# Patient Record
Sex: MALE | Race: WHITE
[De-identification: names, ages, dates, MRNs, and addresses within clinical notes are randomized per-mention and may not be internally consistent; named-entity substitution may affect disease eponyms.]

---

## 2017-09-23 ENCOUNTER — HOSPITAL ENCOUNTER (EMERGENCY)
Dept: HOSPITAL 10 - FTE | Age: 67
Discharge: HOME | End: 2017-09-23
Payer: MEDICAID

## 2017-09-23 VITALS — WEIGHT: 176.37 LBS | HEIGHT: 60 IN | BODY MASS INDEX: 34.63 KG/M2

## 2017-09-23 DIAGNOSIS — Z79.84: ICD-10-CM

## 2017-09-23 DIAGNOSIS — K59.00: Primary | ICD-10-CM

## 2017-09-23 DIAGNOSIS — E11.9: ICD-10-CM

## 2017-09-23 DIAGNOSIS — I10: ICD-10-CM

## 2017-09-23 PROCEDURE — 74010: CPT

## 2017-09-23 NOTE — RADRPT
PROCEDURE:   XR Abdomen. 

 

CLINICAL INDICATION:   Pain. Constipation. 

 

TECHNIQUE:   Four upright and supine frontal radiographs of the abdomen are available for review. 

 

COMPARISON:   None

 

FINDINGS:

 

There is abundant stool throughout the colon. There is scattered air-fluid levels in nondilated smal
l and large bowel. There is no evidence for free gas. Bones unremarkable.

 

IMPRESSION:

 

There is stool compatible with constipation. Air-fluid levels in nondilated bowel, likely related to
 constipation.

 

RPTAT:  HMVK

_____________________________________________ 

.Chauncey Pena MD, MD           Date    Time 

Electronically viewed and signed by .Chauncey Pena MD, MD on 09/23/2017 20:40 

 

D:  09/23/2017 20:40  T:  09/23/2017 20:40

.K/

## 2017-09-23 NOTE — ERD
ER Documentation


Chief Complaint


Date/Time


DATE: 17 


TIME: 21:09


Chief Complaint


constipation x 1 week





HPI


This is a 67-year-old male who presents to the ER with constipation for the 

last week.  Patient states he has not been able have a bowel movement over the 

last week.  He admits to some mild abdominal pain.  He denies any nausea or 

vomiting.  Patient tried milk of magnesia however it did not work.  Patient had 

an episode like this a few years back which resolved spontaneously. Patient has 

not had any abdominal surgeries in the past.





ROS


12 point review of systems was done, all negative except per HPI.





Medications


Home Meds


Active Scripts


Docusate Sodium* (Colace*) 100 Mg Capsule, 100 MG PO TID, #30 CAP


   Prov:WHITLEYINO GOYAL         17


Psyllium Seed* (Metamucil* Powder) 1,040 Gm Powder, 10 GM PO TID for 7 Days, EA


   Prov:INO ARREAGA         17


Reported Medications


Metformin* (Glucophage*) 500 Mg Tab, 500 MG PO BID WITH MEALS, TAB


   4/6/15


Benazepril Hcl* (Benazepril Hcl*) 40 Mg Tablet, 40 MG PO DAILY, TAB


   4/6/15





Allergies


Allergies:  


Coded Allergies:  


     No Known Allergy (Unverified , 14)





PMhx/Soc


Medical and Surgical Hx:  pt denies Surgical Hx


History of Surgery:  No


Anesthesia Reaction:  No


Hx Neurological Disorder:  No


Hx Respiratory Disorders:  No


Hx Cardiac Disorders:  Yes (HTN)


Hx Psychiatric Problems:  No


Hx Miscellaneous Medical Probl:  Yes (DM)


Hx Alcohol Use:  No


Hx Substance Use:  No


Hx Tobacco Use:  No


Smoking Status:  Never smoker





Physical Exam


Vitals





Vital Signs








  Date Time  Temp Pulse Resp B/P Pulse Ox O2 Delivery O2 Flow Rate FiO2


 


17 18:59 97.0 61 20 189/84 100   








Physical Exam


GENERAL: The patient is well developed and appropriate for usual state of health

, in no apparent distress.


HEENT: Atraumatic. 


CHEST: Clear to auscultation bilaterally. There are no rales, wheezes or 

rhonchi. 


HEART: Regular rate and rhythm. No murmurs, clicks, rubs or gallops.


ABDOMEN: Soft, nontender and nondistended. Good bowel sounds. No rebound or 

guarding. No gross peritonitis. No gross organomegaly or masses. No Kee sign 

or McBurney point tenderness.


NEURO: Alert and oriented. 


SKIN: The skin is warm and dry.


Results 24 hrs





 Current Medications








 Medications


  (Trade)  Dose


 Ordered  Sig/Meme


 Route


 PRN Reason  Start Time


 Stop Time Status Last Admin


Dose Admin


 


 Lactulose


  (Enulose)  20 gm  ONCE  ONCE


 PO


   17 19:30


 17 19:31 DC 17 19:16


 





[place] [id number]


 Radiology Main Line: [phone]





 DIAGNOSTIC IMAGING REPORT





 Patient: EKATERINA ABRAHAM   : 1950   Age: 67  Sex: M                   

     


 MR #:    [id number]   Acct #:   [id number]    DOS: 17 0000


 Ordering MD: INO ARREAGA. PA-C   Location:  [facility]   Room/Bed:              

                              


 








PROCEDURE:   XR Abdomen. 


 


CLINICAL INDICATION:   Pain. Constipation. 


 


TECHNIQUE:   Four upright and supine frontal radiographs of the abdomen are 

available for review. 


 


COMPARISON:   None


 


FINDINGS:


 


There is abundant stool throughout the colon. There is scattered air-fluid 

levels in nondilated small and large bowel. There is no evidence for free gas. 

Bones unremarkable.


 


IMPRESSION:


 


There is stool compatible with constipation. Air-fluid levels in nondilated 

bowel, likely related to constipation.


 


RPTAT:  HMVK


_____________________________________________ 


.Chauncey Pena MD, MD           Date    Time 


Electronically viewed and signed by .Chauncey Pena MD, MD on 2017 20:40 


 


D:  2017 20:40  T:  2017 20:40


.K/





CC: INO ARERAGA





Procedures/MDM


This is a 67-year-old male presents to the ER with constipation.  At this time 

there is no evidence of obstruction.  Will be sent home with docusate and 

Metamucil.  Patient is to follow-up with PCP within 1-2 days or return to ER 

sooner if symptoms worsen. My medical decision making was shared with the 

patient, he understands and agrees with plan.





Departure


Diagnosis:  


 Primary Impression:  


 Constipation


Condition:  Stable


Patient Instructions:  Constipation (Adult)





Additional Instructions:  


Llame al doctor MAANA y brian litzy SCOTTY PARA DENTRO DE 1-2 WALLACE.Dgale a la 

secretaria que nosotros le instruimos hacer esta scotty.Avise o llame si talavera 

condicin se empeora antes de la scotty. Regresa aqui si peor o no mejor.











INO ARREAGA Sep 23, 2017 21:20

## 2017-11-16 ENCOUNTER — HOSPITAL ENCOUNTER (EMERGENCY)
Dept: HOSPITAL 10 - FTE | Age: 67
Discharge: HOME | End: 2017-11-16
Payer: MEDICAID

## 2017-11-16 VITALS
BODY MASS INDEX: 27.72 KG/M2 | WEIGHT: 176.59 LBS | BODY MASS INDEX: 27.72 KG/M2 | WEIGHT: 176.59 LBS | HEIGHT: 67 IN | HEIGHT: 67 IN

## 2017-11-16 VITALS
TEMPERATURE: 98.7 F | RESPIRATION RATE: 17 BRPM | HEART RATE: 70 BPM | SYSTOLIC BLOOD PRESSURE: 118 MMHG | DIASTOLIC BLOOD PRESSURE: 60 MMHG

## 2017-11-16 DIAGNOSIS — Z79.84: ICD-10-CM

## 2017-11-16 DIAGNOSIS — I10: ICD-10-CM

## 2017-11-16 DIAGNOSIS — E11.9: ICD-10-CM

## 2017-11-16 DIAGNOSIS — J06.9: Primary | ICD-10-CM

## 2017-11-16 PROCEDURE — 71010: CPT

## 2017-11-16 PROCEDURE — 87400 INFLUENZA A/B EACH AG IA: CPT

## 2017-11-16 NOTE — RADRPT
PROCEDURE:   XR Chest. 

 

CLINICAL INDICATION:   Cough 

 

TECHNIQUE:   Single frontal chest x-ray. 

 

COMPARISON:   None. 

 

FINDINGS:

 

The lungs are clear.  No focal opacification is seen. There is mild to moderate elevation of the lef
t hemidiaphragm. The cardiomediastinal silhouette is unremarkable.  The osseous structures are remar
kable for multilevel degenerative enthesopathy of the spine.   

 

IMPRESSION:

 

1.  There is no acute cardiopulmonary process.

2.  Mild elevation of the left hemidiaphragm.

3.  Multilevel degenerative enthesopathy of the spine.

 

 

RPTAT: PP

_____________________________________________ 

.Eugene Zimmerman MD, MD           Date    Time 

Electronically viewed and signed by .Eugene Zimmerman MD, MD on 11/16/2017 22:10 

 

D:  11/16/2017 22:10  T:  11/16/2017 22:10

.B/

## 2017-11-16 NOTE — ERD
ER Documentation


Chief Complaint


Chief Complaint


BIB SELF C/O COUGH AND CONGESTION X 2 DAYS





HPI


This is a 67-year-old male who presents the emergency department today 

complaining of cough and congestion for the past couple of days.  Patient 

states he is also had some body aches and sore throat.  States that the cough 

is worse at night.  States he has not taken any medication for the cough.





ROS


All systems reviewed and are negative except as per history of present illness.





Medications


Home Meds


Active Scripts


Cetirizine Hcl* (Zyrtec*) 10 Mg Capsule, 10 MG PO DAILY, #15 TAB.CHEW


   Prov:YANICK HAAS PA-C         17


Acetaminophen* (Tylophen*) 500 Mg Capsule, 1 CAP PO Q6H Y for PAIN AND OR 

ELEVATED TEMP, #30 CAP


   Prov:YANICK HAAS PA-C         17


Ibuprofen* (Motrin*) 600 Mg Tab, 600 MG PO Q6, #30 TAB


   Prov:YANICK HAAS PA-C         17


Guaifenesin-Dextromethorphan* (Robitussin* DM) 100MG/10MG/5ML Syrup, 10 ML PO 

Q6H Y for COUGH for 5 Days, ML


   Prov:YANICK HAAS PA-C         17


Docusate Sodium* (Colace*) 100 Mg Capsule, 100 MG PO TID, #30 CAP


   Prov:INO ARREAAG         17


Psyllium Seed* (Metamucil* Powder) 1,040 Gm Powder, 10 GM PO TID for 7 Days, EA


   Prov:INO ARREAGA         17


Reported Medications


Metformin* (Glucophage*) 500 Mg Tab, 500 MG PO BID WITH MEALS, TAB


   4/6/15


Benazepril Hcl* (Benazepril Hcl*) 40 Mg Tablet, 40 MG PO DAILY, TAB


   4/6/15





Allergies


Allergies:  


Coded Allergies:  


     No Known Allergy (Unverified , 14)





PMhx/Soc


History of Surgery:  No


Anesthesia Reaction:  No


Hx Neurological Disorder:  No


Hx Respiratory Disorders:  No


Hx Cardiac Disorders:  Yes (HTN)


Hx Psychiatric Problems:  No


Hx Miscellaneous Medical Probl:  Yes (DM)


Hx Alcohol Use:  No


Hx Substance Use:  No


Hx Tobacco Use:  No


Smoking Status:  Never smoker





Physical Exam


Vitals





Vital Signs








  Date Time  Temp Pulse Resp B/P Pulse Ox O2 Delivery O2 Flow Rate FiO2


 


17 23:30 98.7 70 17 118/60 95 Room Air  


 


17 19:07 98.1 73 20 137/63 98   








Physical Exam


Const:     NAD


Head:   Atraumatic 


Eyes:    Normal Conjunctiva


ENT:    TMs normal.  Nose no drainage.  Throat no erythema no exudate.


Neck:               Full range of motion..~ No meningismus.


Resp:    Clear to auscultation bilaterally. No absent breath sounds


Cardio:    Regular rate and rhythm, no murmurs


Abd:    Soft, non tender, non distended. Normal bowel sounds


Skin:    No petechiae or rashes


Back:    No midline or flank tenderness


Ext:    No cyanosis, or edema


Neur:    Awake and alert


Psych:    Normal Mood and Affect


Results 24 hrs





 Current Medications








 Medications


  (Trade)  Dose


 Ordered  Sig/Meme


 Route


 PRN Reason  Start Time


 Stop Time Status Last Admin


Dose Admin


 


 Ibuprofen


  (Motrin)  800 mg  ONCE  ONCE


 PO


   17 22:30


 17 22:31 DC 17 22:40


 





DIAGNOSTIC IMAGING REPORT





 Patient: EKATERINA ABRAHAM   : 1950   Age: 67  Sex: M                   

     


 MR #:    [id number]   Acct #:   [id number]    DOS: 17 0000


 Ordering MD: YANICK HAAS PA-C   Location:  [facility]   Room/Bed:             

                               


 








PROCEDURE:   XR Chest. 


 


CLINICAL INDICATION:   Cough 


 


TECHNIQUE:   Single frontal chest x-ray. 


 


COMPARISON:   None. 


 


FINDINGS:


 


The lungs are clear.  No focal opacification is seen. There is mild to moderate 

elevation of the left hemidiaphragm. The cardiomediastinal silhouette is 

unremarkable.  The osseous structures are remarkable for multilevel 

degenerative enthesopathy of the spine.   


 


IMPRESSION:


 


1.  There is no acute cardiopulmonary process.


2.  Mild elevation of the left hemidiaphragm.


3.  Multilevel degenerative enthesopathy of the spine.


 


 


RPTAT: PP


_____________________________________________ 


.Eugene Zimmerman MD, MD           Date    Time 


Electronically viewed and signed by .Eugene Zimmerman MD, MD on 2017 22:10 


 


D:  2017 22:10  T:  2017 22:10


.B/





CC: YANICK HAAS PA-C


RUN DATE: 17      [facility]                

  PAGE 1   


RUN TIME: [id number] [place] [address]


                         Marcelino Parish M.D. Medical Director 


                                  TIANNA#: [id number]  


                           Ph: [phone]  Fax: [phone]














--------------------------------------------------------------------------------

------------





Name:  EKATERINA ABRAHAM                  Age/Sex: 67/M      Attend Dr: RAMANDEEP HEALY MD      


Acct:  [id number]  MR# : [id number]  : 1950    Location:  [facility]       

             


Admit: 17


--------------------------------------------------------------------------------

------------








Specimen: [id number]    Status: Complete    Rafael:     Rcvd: 


                                Source:    [facility] Descrip:        

   


--------------------------------------------------------------------------------

------------





  Procedure                         Result                                     

           


--------------------------------------------------------------------------------

------------





                                                                               

            


                                    *** Microbiology ***                       

             


                                                                               

            


  INFLUENZA A & B BY EIA  Final  


        INFLU A&B BY EIA            INFLUENZA A NEGATIVE (Ref Range Neg)


                                    INFLUENZA B NEGATIVE (Ref Range Neg)





--------------------------------------------------------------------------------

------------
















































































 

................................................................................

............




 Flags: Critical Hi = *H      Critical Lo = *L      Microbiology Abnormal = *


        Abnormal Hi =  H      Abnormal Lo =  L      Blood Bank Abnormal   = *


 Susceptability Flags:   S = Sensitive     R = Resistant     I = Intermediate





                                   ** END OF REPORT **     





Procedures/MDM


This is a 67-year-old male presents to the emergency department today 

complaining of cough and congestion and flulike symptoms for the past 2 days.  

Given patient's age and complaints I did obtain a chest x-ray and influenza swab





Influenza A and B is negative





Chest x ray shows no acute cardiopulmonary process.  There is mild elevation of 

the left hemidiaphragm and multilevel degenerative changes of the spine.  Low 

suspicion for pneumonia, PE, abscess, pleural effusion, pneumothorax








Symptoms at this time is consistent with URI likely viral vs influenza-like 

symptoms.  Patient is afebrile.  He is not tachycardic.  His oxygen saturation 

is 98%.  I have low suspicion for strep pharyngitis, peritonsillar abscess, 

retropharyngeal abscess, otitis media, PNA, sinusitis, abscess, meningitis, 

sepsis, or other acute infectious bacterial process.  





Patient was given Motrin here in the emergency department. Patient was given a 

prescription for Tylenol, Motrin, Robitussin, Zyrtec





At this time the patient is stable for discharge and outpatient management. 

Patient should follow up with their PCP in the next 1-2 days.  They may return 

to the emergency department sooner for any persistent or worsening of symptoms.

  Patient understood and agreed with the plan.





Departure


Diagnosis:  


 Primary Impression:  


 URI (upper respiratory infection)


 URI type:  unspecified URI  Qualified Code:  J06.9 - Upper respiratory tract 

infection, unspecified type


Condition:  YANICK Garland PA-C 2017 22:23

## 2018-03-23 ENCOUNTER — HOSPITAL ENCOUNTER (EMERGENCY)
Dept: HOSPITAL 91 - FTE | Age: 68
Discharge: HOME | End: 2018-03-23
Payer: COMMERCIAL

## 2018-03-23 ENCOUNTER — HOSPITAL ENCOUNTER (EMERGENCY)
Age: 68
Discharge: HOME | End: 2018-03-23

## 2018-03-23 DIAGNOSIS — Z79.84: ICD-10-CM

## 2018-03-23 DIAGNOSIS — I10: ICD-10-CM

## 2018-03-23 DIAGNOSIS — K59.00: Primary | ICD-10-CM

## 2018-03-23 DIAGNOSIS — E11.9: ICD-10-CM

## 2018-03-23 PROCEDURE — 74018 RADEX ABDOMEN 1 VIEW: CPT

## 2018-03-23 PROCEDURE — 99283 EMERGENCY DEPT VISIT LOW MDM: CPT

## 2018-09-16 ENCOUNTER — HOSPITAL ENCOUNTER (EMERGENCY)
Age: 68
Discharge: HOME | End: 2018-09-16

## 2018-09-16 ENCOUNTER — HOSPITAL ENCOUNTER (EMERGENCY)
Dept: HOSPITAL 91 - E/R | Age: 68
Discharge: HOME | End: 2018-09-16
Payer: COMMERCIAL

## 2018-09-16 DIAGNOSIS — I10: ICD-10-CM

## 2018-09-16 DIAGNOSIS — M79.671: Primary | ICD-10-CM

## 2018-09-16 DIAGNOSIS — E13.49: ICD-10-CM

## 2018-09-16 DIAGNOSIS — M79.672: ICD-10-CM

## 2018-09-16 DIAGNOSIS — Z79.84: ICD-10-CM

## 2018-09-16 LAB
ADD MAN DIFF?: NO
ALANINE AMINOTRANSFERASE: 30 IU/L (ref 13–69)
ALBUMIN/GLOBULIN RATIO: 1.05
ALBUMIN: 4 G/DL (ref 3.3–4.9)
ALKALINE PHOSPHATASE: 105 IU/L (ref 42–121)
ANION GAP: 14 (ref 8–16)
ANISOCYTOSIS: (no result) (ref 0–0)
ASPARTATE AMINO TRANSFERASE: 26 IU/L (ref 15–46)
BAND NEUTROPHILS #M: 0.1 10^3/UL (ref 0–0.6)
BAND NEUTROPHILS % (M): 2 % (ref 0–4)
BASOPHIL #: 0 10^3/UL (ref 0–0.1)
BASOPHIL #M: 0.1 10^3/UL (ref 0–0)
BASOPHILS % (M): 2 % (ref 0–2)
BASOPHILS %: 0.4 % (ref 0–2)
BILIRUBIN,DIRECT: 0 MG/DL (ref 0–0.2)
BILIRUBIN,TOTAL: 0.4 MG/DL (ref 0.2–1.3)
BLOOD UREA NITROGEN: 16 MG/DL (ref 7–20)
CALCIUM: 9 MG/DL (ref 8.4–10.2)
CARBON DIOXIDE: 26 MMOL/L (ref 21–31)
CHLORIDE: 103 MMOL/L (ref 97–110)
CREATININE: 0.85 MG/DL (ref 0.61–1.24)
EOSINOPHILS #: 0.1 10^3/UL (ref 0–0.5)
EOSINOPHILS % (M): 2 % (ref 0–7)
EOSINOPHILS %: 1.1 % (ref 0–7)
GLOBULIN: 3.8 G/DL (ref 1.3–3.2)
GLUCOSE: 195 MG/DL (ref 70–220)
HEMATOCRIT: 39 % (ref 42–52)
HEMOGLOBIN: 13.7 G/DL (ref 14–18)
IMMATURE GRANS #M: 0.04 10^3/UL (ref 0–0.03)
IMMATURE GRANS % (M): 0.4 % (ref 0–0.43)
LYMPHOCYTES #: 2 10^3/UL (ref 0.8–2.9)
LYMPHOCYTES #M: 2 10^3/UL (ref 0.8–2.9)
LYMPHOCYTES % (M): 23 % (ref 15–51)
LYMPHOCYTES %: 22.6 % (ref 15–51)
MEAN CORPUSCULAR HEMOGLOBIN: 29.5 PG (ref 29–33)
MEAN CORPUSCULAR HGB CONC: 35.1 G/DL (ref 32–37)
MEAN CORPUSCULAR VOLUME: 84.1 FL (ref 82–101)
MEAN PLATELET VOLUME: 9.2 FL (ref 7.4–10.4)
MICROCYTOSIS: (no result) (ref 0–0)
MONOCYTE #: 0.6 10^3/UL (ref 0.3–0.9)
MONOCYTE #M: 0.4 10^3/UL (ref 0.3–0.9)
MONOCYTES % (M): 5 % (ref 0–11)
MONOCYTES %: 6.6 % (ref 0–11)
NEUTROPHIL #: 6.2 10^3/UL (ref 1.6–7.5)
NEUTROPHILS %: 68.9 % (ref 39–77)
NUCLEATED RED BLOOD CELLS #: 0 10^3/UL (ref 0–0)
NUCLEATED RED BLOOD CELLS%: 0 /100WBC (ref 0–0)
PLATELET COUNT: 274 10^3/UL (ref 140–415)
PLATELET ESTIMATE: NORMAL
POTASSIUM: 4.3 MMOL/L (ref 3.5–5.1)
REACTIVE LYMPHOCYTES #M: 0.5 10^3/UL (ref 0–0)
REACTIVE LYMPHOCYTES% (M): 6 % (ref 0–0)
RED BLOOD COUNT: 4.64 10^6/UL (ref 4.7–6.1)
RED CELL DISTRIBUTION WIDTH: 13 % (ref 11.5–14.5)
SEG NEUT #M: 5.4 10^3/UL (ref 1.6–7.5)
SEGMENTED NEUTROPHILS (M) %: 60 % (ref 39–77)
SMUDGE%M: 1 % (ref 0–0)
SODIUM: 139 MMOL/L (ref 135–144)
TOTAL PROTEIN: 7.8 G/DL (ref 6.1–8.1)
TROPONIN-I: 0.01 NG/ML (ref 0–0.12)
WHITE BLOOD COUNT: 9 10^3/UL (ref 4.8–10.8)

## 2018-09-16 PROCEDURE — 96374 THER/PROPH/DIAG INJ IV PUSH: CPT

## 2018-09-16 PROCEDURE — 82962 GLUCOSE BLOOD TEST: CPT

## 2018-09-16 PROCEDURE — 99285 EMERGENCY DEPT VISIT HI MDM: CPT

## 2018-09-16 PROCEDURE — 84484 ASSAY OF TROPONIN QUANT: CPT

## 2018-09-16 PROCEDURE — 80053 COMPREHEN METABOLIC PANEL: CPT

## 2018-09-16 PROCEDURE — 93005 ELECTROCARDIOGRAM TRACING: CPT

## 2018-09-16 PROCEDURE — 71045 X-RAY EXAM CHEST 1 VIEW: CPT

## 2018-09-16 PROCEDURE — 36415 COLL VENOUS BLD VENIPUNCTURE: CPT

## 2018-09-16 PROCEDURE — 85025 COMPLETE CBC W/AUTO DIFF WBC: CPT

## 2018-09-16 RX ADMIN — KETOROLAC TROMETHAMINE 1 MG: 15 INJECTION, SOLUTION INTRAMUSCULAR; INTRAVENOUS at 09:03
